# Patient Record
Sex: FEMALE | Race: WHITE | NOT HISPANIC OR LATINO | Employment: OTHER | URBAN - METROPOLITAN AREA
[De-identification: names, ages, dates, MRNs, and addresses within clinical notes are randomized per-mention and may not be internally consistent; named-entity substitution may affect disease eponyms.]

---

## 2022-09-14 PROBLEM — E03.9 HYPOTHYROIDISM: Status: ACTIVE | Noted: 2017-12-26

## 2022-09-14 PROBLEM — E78.5 HYPERLIPIDEMIA: Status: ACTIVE | Noted: 2021-10-24

## 2022-09-14 PROBLEM — I10 HYPERTENSIVE DISORDER: Status: ACTIVE | Noted: 2017-12-26

## 2022-09-14 PROBLEM — J30.9 ALLERGIC RHINITIS: Status: ACTIVE | Noted: 2018-03-06

## 2022-09-14 PROBLEM — I48.20 CHRONIC ATRIAL FIBRILLATION: Status: ACTIVE | Noted: 2021-07-31

## 2022-09-21 ENCOUNTER — LAB (OUTPATIENT)
Dept: LAB | Facility: HOSPITAL | Age: 77
End: 2022-09-21

## 2022-09-21 ENCOUNTER — OFFICE VISIT (OUTPATIENT)
Dept: FAMILY MEDICINE CLINIC | Facility: CLINIC | Age: 77
End: 2022-09-21

## 2022-09-21 VITALS
HEIGHT: 65 IN | BODY MASS INDEX: 36.52 KG/M2 | WEIGHT: 219.2 LBS | DIASTOLIC BLOOD PRESSURE: 80 MMHG | OXYGEN SATURATION: 98 % | SYSTOLIC BLOOD PRESSURE: 122 MMHG | HEART RATE: 104 BPM

## 2022-09-21 DIAGNOSIS — E78.2 MIXED HYPERLIPIDEMIA: ICD-10-CM

## 2022-09-21 DIAGNOSIS — I48.20 CHRONIC ATRIAL FIBRILLATION: ICD-10-CM

## 2022-09-21 DIAGNOSIS — E03.9 HYPOTHYROIDISM, UNSPECIFIED TYPE: Primary | ICD-10-CM

## 2022-09-21 DIAGNOSIS — I10 PRIMARY HYPERTENSION: ICD-10-CM

## 2022-09-21 DIAGNOSIS — E03.9 HYPOTHYROIDISM, UNSPECIFIED TYPE: ICD-10-CM

## 2022-09-21 DIAGNOSIS — M1A.9XX0 CHRONIC GOUT INVOLVING TOE OF LEFT FOOT WITHOUT TOPHUS, UNSPECIFIED CAUSE: ICD-10-CM

## 2022-09-21 DIAGNOSIS — J30.9 ALLERGIC RHINITIS, UNSPECIFIED SEASONALITY, UNSPECIFIED TRIGGER: ICD-10-CM

## 2022-09-21 PROCEDURE — 99204 OFFICE O/P NEW MOD 45 MIN: CPT | Performed by: STUDENT IN AN ORGANIZED HEALTH CARE EDUCATION/TRAINING PROGRAM

## 2022-09-21 PROCEDURE — 84443 ASSAY THYROID STIM HORMONE: CPT

## 2022-09-21 NOTE — PROGRESS NOTES
New Patient Office Visit      Patient Name: Margoth Perez  : 1945   MRN: [id number]   Care Team: Patient Care Team:  Liliya Yoder DO as PCP - General (Internal Medicine)    Chief Complaint:    Chief Complaint   Patient presents with   • new patient preventative medicine service     Est care    • Dizziness     Started last month   • night time voiding    • Edema   • Fatigue   • varicosities   • Gout       History of Present Illness: Margoth Perez is a 77 y.o. female with HTN, hypothyroidism, afib on eliquis, HLD, and allergic rhinitis who is here today to establish care. She moved from [place] to [place] last year. She lives alone, she has three adult children.  She is planning to move within the next year. Previously following with [facility] - Destiny Mejia PA-C. Follows with Dr. Steel and Geraldo for cardiology needs. Follows with Dr. Kilgore for Ophthalmology needs.     Hypothyroidism - taking Synthroid 88mcg daily, has been on this dose for several years    HTN - taking Lisinopril 5mg daily, very well controlled with this    AF on Eliquis and metoprolol. Follows with [facility] Lino Steel and Geraldo.    She complains of bilateral hip pain, has been diagnosed with IT band syndrome, chronic low back pain.    Gout - takes prednisone prn for flares. She has had 3 flares in her left foot around the big toe in the past year. She does not want to take medications every day to lower her uric acid level such as allopurinol.    Obesity - has gained about 20lb in the past 2 years, lives sedentary lifestyle.     Former smoker, quit 22 years ago.     Subjective      Review of Systems:   Review of Systems - See HPI    Past Medical History:   Past Medical History:   Diagnosis Date   • A-fib (HCC)    • Allergic    • Arthritis    • Coronary artery disease     Afib   • Gout    • Headache    • Hypertension    • Hypothyroidism    • Low back pain    • Seasonal  allergies    • Visual impairment        Past Surgical History:   Past Surgical History:   Procedure Laterality Date   • ADENOIDECTOMY     • CARDIAC CATHETERIZATION     • FOOT SURGERY Bilateral    • TONSILLECTOMY         Family History: History reviewed. No pertinent family history.    Social History:   Social History     Socioeconomic History   • Marital status:    Tobacco Use   • Smoking status: Former Smoker     Packs/day: 0.50     Years: 25.00     Pack years: 12.50     Types: Cigarettes     Quit date: 2000     Years since quittin.7   • Smokeless tobacco: Never Used   • Tobacco comment: Quit    Vaping Use   • Vaping Use: Never used   Substance and Sexual Activity   • Alcohol use: Yes     Comment: Rare consumption   • Drug use: Never   • Sexual activity: Not Currently     Partners: Male       Tobacco History:   Social History     Tobacco Use   Smoking Status Former Smoker   • Packs/day: 0.50   • Years: 25.00   • Pack years: 12.50   • Types: Cigarettes   • Quit date: 2000   • Years since quittin.7   Smokeless Tobacco Never Used   Tobacco Comment    Quit        Medications:     Current Outpatient Medications:   •  apixaban (ELIQUIS) 5 MG tablet tablet, Take 5 mg by mouth 2 (Two) Times a Day., Disp: , Rfl:   •  levothyroxine (SYNTHROID, LEVOTHROID) 88 MCG tablet, levothyroxine 88 mcg tablet, Disp: , Rfl:   •  lisinopril (PRINIVIL,ZESTRIL) 5 MG tablet, lisinopril 5 mg tablet, Disp: , Rfl:   •  loratadine (CLARITIN) 10 MG tablet, Claritin 10 mg tablet  Take 1 tablet every day by oral route., Disp: , Rfl:   •  metoprolol tartrate (LOPRESSOR) 50 MG tablet, Take 75 mg by mouth 2 (Two) Times a Day., Disp: , Rfl:     Allergies:   Allergies   Allergen Reactions   • Progesterone Shortness Of Breath   • Latex Hives   • Wound Dressing Adhesive Hives   • Adhesive Tape Rash       Objective     Physical Exam:  Vital Signs:   Vitals:    22 1312   BP: 122/80   Pulse: 104   SpO2: 98%  "  Weight: 99.4 kg (219 lb 3.2 oz)   Height: 165.1 cm (65\")     Body mass index is 36.48 kg/m².     Physical Exam  Vitals reviewed.   Constitutional:       Appearance: Normal appearance.   Pulmonary:      Effort: Pulmonary effort is normal. No respiratory distress.   Skin:     General: Skin is warm and dry.   Neurological:      Mental Status: She is alert.   Psychiatric:         Mood and Affect: Mood normal.         Behavior: Behavior normal.         Judgment: Judgment normal.         Assessment / Plan      Assessment/Plan:   Problems Addressed This Visit  Diagnoses and all orders for this visit:    1. Hypothyroidism, unspecified type (Primary)  -     TSH Rfx On Abnormal To Free T4; Future  Due for labs today, will refill synthroid and adjust dose if necessary.    2. Allergic rhinitis, unspecified seasonality, unspecified trigger  Continue claritin daily    3. Chronic atrial fibrillation (HCC)  Continue Eliquis and metoprolol 75mg BID. Her rate is a little high today, may benefit from increasing metoprolol to 100mg BID in the future, did not have time to address this in detail today. Follows with [facility] Lino Steel and Geraldo.    4. Mixed hyperlipidemia  Due for lipid panel but she is not fasting today. Will plan for this at next visit     5. Primary hypertension  Well controlled, at goal <130/80. Continue lisinopril 5mg daily    6. Chronic gout   Discussed urate lowering options such as allopurinol to prevent flares but she prefers to not take medication daily, prefers to monitor and treat flares as needed. We discussed dietary modifications. She does not drink significant amount of alcohol. She does not have current flare but requested prescription for prednisone as needed. I explained to her that I do not typically write steroids to be available on an as needed basis due to safety/appropriation of use but also reassured her that if she were to have a flare, she is now established and may " call//message to seek treatment when needed.     Plan of care reviewed with patient at the conclusion of today's visit. Education was provided regarding diagnosis and management.  Patient verbalizes understanding of and agreement with management plan.      Follow Up:   Return if symptoms worsen or fail to improve.          DO LUISA Vasquez RD  [facility]  [address]  [facility] [address]  Fax [phone]  Phone [phone]

## 2022-09-22 ENCOUNTER — TELEPHONE (OUTPATIENT)
Dept: FAMILY MEDICINE CLINIC | Facility: CLINIC | Age: 77
End: 2022-09-22

## 2022-09-22 DIAGNOSIS — E03.9 HYPOTHYROIDISM, UNSPECIFIED TYPE: Primary | ICD-10-CM

## 2022-09-22 LAB — TSH SERPL DL<=0.05 MIU/L-ACNC: 1 UIU/ML (ref 0.27–4.2)

## 2022-09-22 RX ORDER — LEVOTHYROXINE SODIUM 88 UG/1
88 TABLET ORAL DAILY
Qty: 90 TABLET | Refills: 3 | Status: SHIPPED | OUTPATIENT
Start: 2022-09-22

## 2022-09-22 NOTE — TELEPHONE ENCOUNTER
Attempted to contact patient no answer left vm       Message from Liliya Yoder, DO sent at 9/22/2022    Please call patient with results. Thyroid function is normal, I have sent refills for her synthroid      HUB MAY RELAY RESULTS, AND DOCUMENT

## 2022-09-27 ENCOUNTER — TELEPHONE (OUTPATIENT)
Dept: FAMILY MEDICINE CLINIC | Facility: CLINIC | Age: 77
End: 2022-09-27

## 2022-09-27 DIAGNOSIS — I10 PRIMARY HYPERTENSION: ICD-10-CM

## 2022-09-27 DIAGNOSIS — I48.20 CHRONIC ATRIAL FIBRILLATION: Primary | ICD-10-CM

## 2022-09-27 DIAGNOSIS — E78.2 MIXED HYPERLIPIDEMIA: ICD-10-CM

## 2022-09-27 NOTE — TELEPHONE ENCOUNTER
Pt called the office back stating she is very upset that labs weren't ordered at her visit on 9/21/2022. PCP's message from below was relayed. Pt states she was told by PCP that labs would be ordered at that time. I informed her that I'm not sure what happened but she has placed orders today if she would like to have labs done. Pt states this is not the medical facility that she would like to be part of if she can't have labs ordered when she is told and hung up the phone.     Liliya Yoder,      9/27/22 8:20 AM  Note    She is due for cholesterol labs and also annual screening of blood counts, kidney function, liver function. I have put in orders for all. Please fast for these.

## 2022-09-27 NOTE — TELEPHONE ENCOUNTER
She is due for cholesterol labs and also annual screening of blood counts, kidney function, liver function. I have put in orders for all. Please fast for these.

## 2022-09-27 NOTE — TELEPHONE ENCOUNTER
Caller: Margoth Perez    Relationship to patient: Self    Best call back number:     Patient is needing: CALLING LILIAM BACK IN THE OFFICE

## 2022-09-27 NOTE — TELEPHONE ENCOUNTER
Attempted to contact pt, no answer. Left detailed voicemail informing pt that labs have been ordered, lab hours given, and if she had additional questions to give the office a call.

## 2022-09-27 NOTE — TELEPHONE ENCOUNTER
Caller: Margoth Perez    Relationship: Self    Best call back number: [phone]    What orders are you requesting (i.e. lab or imaging): CHOLESTEROL LABS    In what timeframe would the patient need to come in: ASAP     Where will you receive your lab/imaging services: IN OFFICE

## 2023-04-17 ENCOUNTER — HOSPITAL ENCOUNTER (EMERGENCY)
Facility: HOSPITAL | Age: 78
Discharge: HOME | End: 2023-04-18
Attending: EMERGENCY MEDICINE | Admitting: EMERGENCY MEDICINE
Payer: COMMERCIAL

## 2023-04-17 ENCOUNTER — APPOINTMENT (OUTPATIENT)
Dept: GENERAL RADIOLOGY | Facility: HOSPITAL | Age: 78
End: 2023-04-17
Payer: COMMERCIAL

## 2023-04-17 DIAGNOSIS — E66.09 CLASS 2 OBESITY DUE TO EXCESS CALORIES WITH BODY MASS INDEX (BMI) OF 37.0 TO 37.9 IN ADULT, UNSPECIFIED WHETHER SERIOUS COMORBIDITY PRESENT: ICD-10-CM

## 2023-04-17 DIAGNOSIS — I48.0 PAROXYSMAL ATRIAL FIBRILLATION WITH RAPID VENTRICULAR RESPONSE: Primary | ICD-10-CM

## 2023-04-17 DIAGNOSIS — E78.5 HYPERLIPIDEMIA, UNSPECIFIED HYPERLIPIDEMIA TYPE: ICD-10-CM

## 2023-04-17 DIAGNOSIS — I10 ELEVATED BLOOD PRESSURE READING WITH DIAGNOSIS OF HYPERTENSION: ICD-10-CM

## 2023-04-17 LAB
ALBUMIN SERPL-MCNC: 4.1 G/DL (ref 3.5–5.2)
ALBUMIN/GLOB SERPL: 1.4 G/DL
ALP SERPL-CCNC: 99 U/L (ref 39–117)
ALT SERPL W P-5'-P-CCNC: 17 U/L (ref 1–33)
ANION GAP SERPL CALCULATED.3IONS-SCNC: 10 MMOL/L (ref 5–15)
AST SERPL-CCNC: 18 U/L (ref 1–32)
BASOPHILS # BLD AUTO: 0.03 10*3/MM3 (ref 0–0.2)
BASOPHILS NFR BLD AUTO: 0.2 % (ref 0–1.5)
BILIRUB SERPL-MCNC: 0.4 MG/DL (ref 0–1.2)
BUN SERPL-MCNC: 17 MG/DL (ref 8–23)
BUN/CREAT SERPL: 19.3 (ref 7–25)
CALCIUM SPEC-SCNC: 9.2 MG/DL (ref 8.6–10.5)
CHLORIDE SERPL-SCNC: 100 MMOL/L (ref 98–107)
CO2 SERPL-SCNC: 30 MMOL/L (ref 22–29)
CREAT SERPL-MCNC: 0.88 MG/DL (ref 0.57–1)
DEPRECATED RDW RBC AUTO: 44.3 FL (ref 37–54)
EGFRCR SERPLBLD CKD-EPI 2021: 67.8 ML/MIN/1.73
EOSINOPHIL # BLD AUTO: 0.07 10*3/MM3 (ref 0–0.4)
EOSINOPHIL NFR BLD AUTO: 0.5 % (ref 0.3–6.2)
ERYTHROCYTE [DISTWIDTH] IN BLOOD BY AUTOMATED COUNT: 12.9 % (ref 12.3–15.4)
GLOBULIN UR ELPH-MCNC: 3 GM/DL
GLUCOSE SERPL-MCNC: 107 MG/DL (ref 65–99)
HCT VFR BLD AUTO: 44.4 % (ref 34–46.6)
HGB BLD-MCNC: 14.5 G/DL (ref 12–15.9)
HOLD SPECIMEN: NORMAL
HOLD SPECIMEN: NORMAL
IMM GRANULOCYTES # BLD AUTO: 0.18 10*3/MM3 (ref 0–0.05)
IMM GRANULOCYTES NFR BLD AUTO: 1.3 % (ref 0–0.5)
LIPASE SERPL-CCNC: 30 U/L (ref 13–60)
LYMPHOCYTES # BLD AUTO: 2.65 10*3/MM3 (ref 0.7–3.1)
LYMPHOCYTES NFR BLD AUTO: 19.1 % (ref 19.6–45.3)
MCH RBC QN AUTO: 30.5 PG (ref 26.6–33)
MCHC RBC AUTO-ENTMCNC: 32.7 G/DL (ref 31.5–35.7)
MCV RBC AUTO: 93.5 FL (ref 79–97)
MONOCYTES # BLD AUTO: 1.38 10*3/MM3 (ref 0.1–0.9)
MONOCYTES NFR BLD AUTO: 9.9 % (ref 5–12)
NEUTROPHILS NFR BLD AUTO: 69 % (ref 42.7–76)
NEUTROPHILS NFR BLD AUTO: 9.57 10*3/MM3 (ref 1.7–7)
NRBC BLD AUTO-RTO: 0 /100 WBC (ref 0–0.2)
NT-PROBNP SERPL-MCNC: 3162 PG/ML (ref 0–1800)
PLATELET # BLD AUTO: 390 10*3/MM3 (ref 140–450)
PMV BLD AUTO: 8.9 FL (ref 6–12)
POTASSIUM SERPL-SCNC: 3.7 MMOL/L (ref 3.5–5.2)
PROT SERPL-MCNC: 7.1 G/DL (ref 6–8.5)
QT INTERVAL: 348 MS
QTC INTERVAL: 459 MS
RBC # BLD AUTO: 4.75 10*6/MM3 (ref 3.77–5.28)
SODIUM SERPL-SCNC: 140 MMOL/L (ref 136–145)
TROPONIN T SERPL HS-MCNC: 8 NG/L
WBC NRBC COR # BLD: 13.88 10*3/MM3 (ref 3.4–10.8)
WHOLE BLOOD HOLD COAG: NORMAL
WHOLE BLOOD HOLD SPECIMEN: NORMAL

## 2023-04-17 PROCEDURE — 93005 ELECTROCARDIOGRAM TRACING: CPT

## 2023-04-17 PROCEDURE — 80053 COMPREHEN METABOLIC PANEL: CPT

## 2023-04-17 PROCEDURE — 84484 ASSAY OF TROPONIN QUANT: CPT

## 2023-04-17 PROCEDURE — 99283 EMERGENCY DEPT VISIT LOW MDM: CPT

## 2023-04-17 PROCEDURE — 96374 THER/PROPH/DIAG INJ IV PUSH: CPT

## 2023-04-17 PROCEDURE — 36415 COLL VENOUS BLD VENIPUNCTURE: CPT

## 2023-04-17 PROCEDURE — 85025 COMPLETE CBC W/AUTO DIFF WBC: CPT

## 2023-04-17 PROCEDURE — 96375 TX/PRO/DX INJ NEW DRUG ADDON: CPT

## 2023-04-17 PROCEDURE — 99152 MOD SED SAME PHYS/QHP 5/>YRS: CPT

## 2023-04-17 PROCEDURE — 92960 CARDIOVERSION ELECTRIC EXT: CPT

## 2023-04-17 PROCEDURE — 83690 ASSAY OF LIPASE: CPT

## 2023-04-17 PROCEDURE — 83880 ASSAY OF NATRIURETIC PEPTIDE: CPT

## 2023-04-17 PROCEDURE — 71045 X-RAY EXAM CHEST 1 VIEW: CPT

## 2023-04-17 RX ORDER — SODIUM CHLORIDE 0.9 % (FLUSH) 0.9 %
10 SYRINGE (ML) INJECTION AS NEEDED
Status: DISCONTINUED | OUTPATIENT
Start: 2023-04-17 | End: 2023-04-18 | Stop reason: HOSPADM

## 2023-04-17 NOTE — ED PROVIDER NOTES
"Subjective   History of Present Illness  Patient is a 77-year-old female presenting to the emergency department with generalized chest heaviness starting around 330 today.  Patient does have a prior history of atrial fibrillation and states she is unsure when she is in it though she does take Eliquis and is compliant with medications.  Patient reports she went to the walk-in clinic and they sent her here for further evaluation.  Patient reports she was recently started on treatment for gout approximately 4 days ago.  Patient denies any palpitations.  No other acute symptoms or complaints reported.    History provided by:  Patient and medical records      Review of Systems    Past Medical History:   Diagnosis Date   • A-fib    • Allergic    • Arthritis    • Coronary artery disease     Afib   • Gout    • Headache    • Hypertension    • Hypothyroidism    • Low back pain    • Seasonal allergies    • Visual impairment        Allergies   Allergen Reactions   • Medroxyprogesterone Unknown - Low Severity   • Progesterone Shortness Of Breath     Other reaction(s): other/intolerance   • Latex Hives   • Wound Dressing Adhesive Hives   • Sumatriptan Unknown - Low Severity     Panic attack   • Adhesive Tape Rash   • Fexofenadine-Pseudoephed Er Unknown - High Severity     \"allergic to some sort of antihistamine\"       Past Surgical History:   Procedure Laterality Date   • ADENOIDECTOMY     • CARDIAC CATHETERIZATION     • FOOT SURGERY Bilateral    • TONSILLECTOMY         History reviewed. No pertinent family history.    Social History     Socioeconomic History   • Marital status:    Tobacco Use   • Smoking status: Former     Packs/day: 0.50     Years: 25.00     Pack years: 12.50     Types: Cigarettes     Quit date: 2000     Years since quittin.3   • Smokeless tobacco: Never   • Tobacco comments:     Quit    Vaping Use   • Vaping Use: Never used   Substance and Sexual Activity "   • Alcohol use: Yes     Comment: Rare consumption   • Drug use: Never   • Sexual activity: Not Currently     Partners: Male           Objective   Physical Exam  Vitals and nursing note reviewed.   Constitutional:       General: She is not in acute distress.     Appearance: She is well-developed. She is obese. She is not toxic-appearing.   Cardiovascular:      Rate and Rhythm: Normal rate. Rhythm irregularly irregular.   Pulmonary:      Effort: Pulmonary effort is normal. No respiratory distress.      Breath sounds: Normal breath sounds.   Musculoskeletal:         General: Normal range of motion.   Skin:     General: Skin is warm and dry.   Neurological:      Mental Status: She is alert and oriented to person, place, and time.   Psychiatric:         Mood and Affect: Mood normal.         Behavior: Behavior normal.         Procedural Sedation    Date/Time: 4/18/2023 2:09 AM  Performed by: Ephraim Sunshine MD  Authorized by: Ephraim Sunshine MD     Consent:     Consent obtained:  Written    Consent given by:  Patient    Risks discussed:  Allergic reaction, prolonged hypoxia resulting in organ damage, respiratory compromise necessitating ventilatory assistance and intubation, vomiting, nausea, inadequate sedation and dysrhythmia  Universal protocol:     Procedure explained and questions answered to patient or proxy's satisfaction: yes      Immediately prior to procedure, a time out was called: yes      Patient identity confirmed:  Verbally with patient and provided demographic data  Indications:     Procedure performed:  Cardioversion    Procedure necessitating sedation performed by:  Physician performing sedation    Intended level of sedation:  Deep  Pre-sedation assessment:     Time since last food or drink:  12+ hrs    ASA classification: class 2 - patient with mild systemic disease      Mallampati score:  II - soft palate, uvula, fauces visible    Neck mobility: normal      Pre-sedation assessments  completed and reviewed: airway patency, anesthesia/sedation history, cardiovascular function, hydration status, mental status, nausea/vomiting, pain level, respiratory function and temperature      History of difficult intubation: no    Immediate pre-procedure details:     Reassessment: Patient reassessed immediately prior to procedure      Reviewed: vital signs, relevant labs/tests and NPO status      Verified: bag valve mask available, emergency equipment available, IV patency confirmed, oxygen available and suction available    Procedure details (see MAR for exact dosages):     Sedation start time:  4/18/2023 1:36 AM    Preoxygenation:  Nonrebreather mask    Sedation:  Methohexital    Intra-procedure monitoring:  Blood pressure monitoring, continuous capnometry, frequent LOC assessments, frequent vital sign checks, continuous pulse oximetry and cardiac monitor    Intra-procedure events: none      Sedation end time:  4/18/2023 1:46 AM    Total sedation time (minutes):  10  Post-procedure details:     Attendance: Constant attendance by certified staff until patient recovered      Recovery: Patient returned to pre-procedure baseline      Procedure completion:  Tolerated well, no immediate complications  Electrical Cardioversion    Date/Time: 4/18/2023 2:10 AM  Performed by: Ephraim Sunshine MD  Authorized by: Ephraim Sunshine MD     Consent:     Consent obtained:  Written    Consent given by:  Patient  Universal protocol:     Procedure explained and questions answered to patient or proxy's satisfaction: yes      Immediately prior to procedure, a time out was called: yes      Patient identity confirmed:  Verbally with patient and provided demographic data  Pre-procedure details:     Cardioversion basis:  Emergent    Rhythm:  Atrial fibrillation    Electrode placement:  Anterior-posterior  Attempt one:     Cardioversion mode:  Synchronous    Waveform:  Biphasic    Shock (Joules):  150    Shock outcome:   Conversion to normal sinus rhythm  Post-procedure details:     Patient status:  Awake    Procedure completion:  Tolerated well, no immediate complications               ED Course  ED Course as of 04/18/23 0211   Mon Apr 17, 2023   1803 I reviewed the report from a cardiac catheterization in February 2021 demonstrating negative coronary artery disease. [RS]   1939 BP(!): 174/161  Noted on arrival in triage that the patient's documented blood pressure is not compatible with human physiology. [RS]   2357 XR Chest 1 View  Personally reviewed the single view the chest demonstrating no focal lobar infiltrate.  See report for radiology for details. [RS]   Tue Apr 18, 2023   0110 Patient is resting comfortably.  I talked with the patient about consideration for sedation and electrical cardioversion.  I discussed the risks and benefits.  Patient reports she has had it done in the past and would like to have that done once again.  She is agreeable to proceed. [RS]   0147 Successful sedation and electrical cardioversion to a normal sinus rhythm.  Patient tolerated well no observed complications.  We will plan to discharge the patient to follow-up with the A-fib clinic for further evaluation and management. I had a discussion with the patient/family regarding diagnosis, diagnostic results, treatment plan, and medications.  The patient/family indicated understanding of these instructions.  I spent adequate time at the bedside prior to discharge necessary to discuss the aftercare instructions, giving patient education, providing explanations of the results of our evaluations/findings, and my decision making to assure that the patient/family understand the plan of care.  Time was allotted to answer questions at that time and throughout the ED course.  Emphasis was placed on timely follow-up after discharge.  I also discussed the potential for the development of an acute emergent condition requiring further evaluation, return to  the ER, admission, or even surgical intervention. I discussed that we found nothing during the visit today indicating the need for further ER workup at this time, admission to the hospital, or the presence of an acute unstable medical condition.  I encouraged the patient to return to the emergency department immediately for ANY concerns, worsening, new complaints, or if symptoms persist and unable to seek follow-up in a timely fashion.  The patient/family expressed understanding and agreement with this plan.  [RS]      ED Course User Index  [RS] Ephraim Sunshine MD                                           Medical Decision Making  Class 2 obesity due to excess calories with body mass index (BMI) of 37.0 to 37.9 in adult, unspecified whether serious comorbidity present: chronic illness or injury  Elevated blood pressure reading with diagnosis of hypertension: chronic illness or injury  Hyperlipidemia, unspecified hyperlipidemia type: chronic illness or injury  Paroxysmal atrial fibrillation with rapid ventricular response: acute illness or injury  Amount and/or Complexity of Data Reviewed  Labs: ordered.  Radiology:  Decision-making details documented in ED Course.  ECG/medicine tests: ordered and independent interpretation performed.      Risk  Prescription drug management.          Final diagnoses:   Paroxysmal atrial fibrillation with rapid ventricular response   Elevated blood pressure reading with diagnosis of hypertension   Class 2 obesity due to excess calories with body mass index (BMI) of 37.0 to 37.9 in adult, unspecified whether serious comorbidity present   Hyperlipidemia, unspecified hyperlipidemia type       ED Disposition  ED Disposition     ED Disposition   Discharge    Condition   Stable    Comment   --             [facility]  [address]  [address]  [facility] [id number] [phone]    As soon as possible.    [facility]  [facility]  [address] [facility] [address] [phone]    As needed, If symptoms worsen or ANY concerns.    Liliya Yoder, DO  [address] [address]  [phone]               Medication List      No changes were made to your prescriptions during this visit.          Ephraim Sunshine MD  04/18/23 0212

## 2023-04-18 VITALS
HEIGHT: 65 IN | BODY MASS INDEX: 37.32 KG/M2 | DIASTOLIC BLOOD PRESSURE: 81 MMHG | TEMPERATURE: 98.4 F | HEART RATE: 80 BPM | RESPIRATION RATE: 18 BRPM | SYSTOLIC BLOOD PRESSURE: 157 MMHG | WEIGHT: 224 LBS | OXYGEN SATURATION: 100 %

## 2023-04-18 LAB
GEN 5 2HR TROPONIN T REFLEX: 9 NG/L
HOLD SPECIMEN: NORMAL
QT INTERVAL: 368 MS
QTC INTERVAL: 450 MS
TROPONIN T DELTA: 1 NG/L

## 2023-04-18 PROCEDURE — 84484 ASSAY OF TROPONIN QUANT: CPT

## 2023-04-18 RX ORDER — METOPROLOL TARTRATE 5 MG/5ML
5 INJECTION INTRAVENOUS ONCE
Status: COMPLETED | OUTPATIENT
Start: 2023-04-18 | End: 2023-04-18

## 2023-04-18 RX ADMIN — METHOHEXITAL SODIUM 60 MG: 500 INJECTION, POWDER, LYOPHILIZED, FOR SOLUTION INTRAMUSCULAR; INTRAVENOUS; RECTAL at 01:37

## 2023-04-18 RX ADMIN — METHOHEXITAL SODIUM 20 MG: 500 INJECTION, POWDER, LYOPHILIZED, FOR SOLUTION INTRAMUSCULAR; INTRAVENOUS; RECTAL at 01:38

## 2023-04-18 RX ADMIN — METHOHEXITAL SODIUM 20 MG: 500 INJECTION, POWDER, LYOPHILIZED, FOR SOLUTION INTRAMUSCULAR; INTRAVENOUS; RECTAL at 01:39

## 2023-04-18 RX ADMIN — METOPROLOL TARTRATE 5 MG: 5 INJECTION INTRAVENOUS at 02:07

## 2023-04-18 RX ADMIN — METOPROLOL TARTRATE 5 MG: 5 INJECTION INTRAVENOUS at 01:45

## 2023-04-18 RX ADMIN — METHOHEXITAL SODIUM 20 MG: 500 INJECTION, POWDER, LYOPHILIZED, FOR SOLUTION INTRAMUSCULAR; INTRAVENOUS; RECTAL at 01:37

## 2023-04-27 ENCOUNTER — OFFICE VISIT (OUTPATIENT)
Dept: CARDIOLOGY | Facility: HOSPITAL | Age: 78
End: 2023-04-27
Payer: COMMERCIAL

## 2023-04-27 VITALS
DIASTOLIC BLOOD PRESSURE: 71 MMHG | RESPIRATION RATE: 20 BRPM | BODY MASS INDEX: 37.18 KG/M2 | HEIGHT: 65 IN | SYSTOLIC BLOOD PRESSURE: 129 MMHG | HEART RATE: 92 BPM | WEIGHT: 223.13 LBS | TEMPERATURE: 98.3 F | OXYGEN SATURATION: 97 %

## 2023-04-27 DIAGNOSIS — I10 PRIMARY HYPERTENSION: ICD-10-CM

## 2023-04-27 DIAGNOSIS — I48.0 PAF (PAROXYSMAL ATRIAL FIBRILLATION): Primary | ICD-10-CM

## 2023-04-27 NOTE — PROGRESS NOTES
"[facility], [facility] Heart and Vascular    Chief Complaint  Atrial Fibrillation and Establish Care    Subjective    History of Present Illness {CC  Problem List  Visit  Diagnosis   Encounters  Notes  Medications  Labs  Result Review Imaging  Media :23}     Margoth Perez presents to [facility] CARDIOLOGY for   History of Present Illness     77-year-old female with history of hypothyroidism, paroxysmal atrial fibrillation, hypertension, gout.      Presented to [facility] ED on 4/17/2023 with complaints of chest heaviness and discomfort.    Patient's blood pressure was found to be elevated.  Found to be in atrial fibrillation.  Cardioversion was completed in the ER and successful with restoration of sinus rhythm.    Patient continued on Eliquis for stroke prevention and beta-blocker.    Pt followed by cardiology [facility].      Pt typically is not aware of afib but on 2 occassions she had chest pain and was told she was in Afib and received ECV.  1st in 2021 with dx and then 04/17/23.  Otherwise no palpitations, worsening dyspnea (baseline dyspnea mild-moderate with exertion), dizziness, near syncope, syncope, edema.    Compliant with meds.      Objective     Vital Signs:   Vitals:    04/27/23 1442 04/27/23 1444 04/27/23 1447   BP: 128/85 122/70 129/71   BP Location: Right arm Left arm Left arm   Patient Position: Sitting Standing Sitting   Cuff Size: Adult Adult Adult   Pulse: 86 86 92   Resp:   20   Temp:   98.3 °F (36.8 °C)   TempSrc:   Temporal   SpO2: 98% 97% 97%   Weight:   101 kg (223 lb 2 oz)   Height:   165.1 cm (65\")     Body mass index is 37.13 kg/m².  Physical Exam  Vitals reviewed.   Constitutional:       General: She is not in acute distress.     Appearance: Normal appearance.   Cardiovascular:      Rate and Rhythm: Normal rate and regular rhythm.      Heart sounds: Normal heart sounds.   Pulmonary:      Effort: Pulmonary effort is normal.      " Breath sounds: Normal breath sounds.   Musculoskeletal:      Right lower leg: No edema.      Left lower leg: No edema.   Skin:     General: Skin is warm and dry.   Neurological:      Mental Status: She is alert.   Psychiatric:         Mood and Affect: Mood normal.         Behavior: Behavior is cooperative.              Result Review  Data Reviewed:{ Labs  Result Review  Imaging  Med Tab  Media :23}   Left heart catheterization 2/25/2021: No significant coronary artery disease.    Echocardiogram 5/18/2021: EF 56 to 60% (improved from previous EF 2021 of 45 to 50%),    Admission on 04/17/2023, Discharged on 04/18/2023   Component Date Value Ref Range Status   • QT Interval 04/17/2023 348  ms Final   • QTC Interval 04/17/2023 459  ms Final   • QT Interval 04/17/2023 368  ms Final   • QTC Interval 04/17/2023 450  ms Final   • HS Troponin T 04/17/2023 8  <10 ng/L Final   • Glucose 04/17/2023 107 (H)  65 - 99 mg/dL Final   • BUN 04/17/2023 17  8 - 23 mg/dL Final   • Creatinine 04/17/2023 0.88  0.57 - 1.00 mg/dL Final   • Sodium 04/17/2023 140  136 - 145 mmol/L Final   • Potassium 04/17/2023 3.7  3.5 - 5.2 mmol/L Final    Slight hemolysis detected by analyzer. Results may be affected.   • Chloride 04/17/2023 100  98 - 107 mmol/L Final   • CO2 04/17/2023 30.0 (H)  22.0 - 29.0 mmol/L Final   • Calcium 04/17/2023 9.2  8.6 - 10.5 mg/dL Final   • Total Protein 04/17/2023 7.1  6.0 - 8.5 g/dL Final   • Albumin 04/17/2023 4.1  3.5 - 5.2 g/dL Final   • ALT (SGPT) 04/17/2023 17  1 - 33 U/L Final   • AST (SGOT) 04/17/2023 18  1 - 32 U/L Final   • Alkaline Phosphatase 04/17/2023 99  39 - 117 U/L Final   • Total Bilirubin 04/17/2023 0.4  0.0 - 1.2 mg/dL Final   • Globulin 04/17/2023 3.0  gm/dL Final    Calculated Result   • A/G Ratio 04/17/2023 1.4  g/dL Final   • BUN/Creatinine Ratio 04/17/2023 19.3  7.0 - 25.0 Final   • Anion Gap 04/17/2023 10.0  5.0 - 15.0 mmol/L Final   • eGFR 04/17/2023 67.8  >60.0 mL/min/1.73 Final   • Lipase  04/17/2023 30  13 - 60 U/L Final   • proBNP 04/17/2023 3,162.0 (H)  0.0 - 1,800.0 pg/mL Final   • Extra Tube 04/17/2023 Hold for add-ons.   Final    Auto resulted.   • Extra Tube 04/17/2023 hold for add-on   Final    Auto resulted   • Extra Tube 04/17/2023 Hold for add-ons.   Final    Auto resulted.   • Extra Tube 04/17/2023 Hold for add-ons.   Final    Auto resulted.   • Extra Tube 04/17/2023 Hold for add-ons.   Final    Auto resulted   • WBC 04/17/2023 13.88 (H)  3.40 - 10.80 10*3/mm3 Final   • RBC 04/17/2023 4.75  3.77 - 5.28 10*6/mm3 Final   • Hemoglobin 04/17/2023 14.5  12.0 - 15.9 g/dL Final   • Hematocrit 04/17/2023 44.4  34.0 - 46.6 % Final   • MCV 04/17/2023 93.5  79.0 - 97.0 fL Final   • MCH 04/17/2023 30.5  26.6 - 33.0 pg Final   • MCHC 04/17/2023 32.7  31.5 - 35.7 g/dL Final   • RDW 04/17/2023 12.9  12.3 - 15.4 % Final   • RDW-SD 04/17/2023 44.3  37.0 - 54.0 fl Final   • MPV 04/17/2023 8.9  6.0 - 12.0 fL Final   • Platelets 04/17/2023 390  140 - 450 10*3/mm3 Final   • Neutrophil % 04/17/2023 69.0  42.7 - 76.0 % Final   • Lymphocyte % 04/17/2023 19.1 (L)  19.6 - 45.3 % Final   • Monocyte % 04/17/2023 9.9  5.0 - 12.0 % Final   • Eosinophil % 04/17/2023 0.5  0.3 - 6.2 % Final   • Basophil % 04/17/2023 0.2  0.0 - 1.5 % Final   • Immature Grans % 04/17/2023 1.3 (H)  0.0 - 0.5 % Final   • Neutrophils, Absolute 04/17/2023 9.57 (H)  1.70 - 7.00 10*3/mm3 Final   • Lymphocytes, Absolute 04/17/2023 2.65  0.70 - 3.10 10*3/mm3 Final   • Monocytes, Absolute 04/17/2023 1.38 (H)  0.10 - 0.90 10*3/mm3 Final   • Eosinophils, Absolute 04/17/2023 0.07  0.00 - 0.40 10*3/mm3 Final   • Basophils, Absolute 04/17/2023 0.03  0.00 - 0.20 10*3/mm3 Final   • Immature Grans, Absolute 04/17/2023 0.18 (H)  0.00 - 0.05 10*3/mm3 Final   • nRBC 04/17/2023 0.0  0.0 - 0.2 /100 WBC Final   • HS Troponin T 04/18/2023 9  <10 ng/L Final   • Troponin T Delta 04/18/2023 1  >=-4 - <+4 ng/L Final                   Assessment and Plan {CC Problem  List  Visit Diagnosis  ROS  Review (Popup)  Health Maintenance  Quality  BestPractice  Medications  SmartSets  SnapShot Encounters  Media :23}   1. PAF (paroxysmal atrial fibrillation)  S/p ECV  Last occurrence 2021    Currently on beta-blocker, Eliquis for stroke prevention.    Discussed options of increasing beta-blocker, patient would like to monitor at this time    Education discussed about antiarrhythmics, pulmonary vein ablation if indicated.  (Education packet given)    Patient will continue to follow with primary cardiologist.    2. Primary hypertension  Blood pressure better controlled today on beta-blocker and lisinopril.          I spent 30 minutes caring for Margoth on this date of service. This time includes time spent by me in the following activities:preparing for the visit, reviewing tests, obtaining and/or reviewing a separately obtained history, performing a medically appropriate examination and/or evaluation , counseling and educating the patient/family/caregiver and documenting information in the medical record    Follow Up {Instructions Charge Capture  Follow-up Communications :23}   Return if symptoms worsen or fail to improve.    Patient was given instructions and counseling regarding her condition or for health maintenance advice. Please see specific information pulled into the AVS if appropriate.  Patient was instructed to call the [facility] with any questions, concerns, or worsening symptoms.

## 2023-05-02 ENCOUNTER — TELEPHONE (OUTPATIENT)
Dept: CARDIOLOGY | Facility: HOSPITAL | Age: 78
End: 2023-05-02
Payer: COMMERCIAL

## 2023-05-02 DIAGNOSIS — I10 PRIMARY HYPERTENSION: Primary | ICD-10-CM

## 2023-05-02 DIAGNOSIS — I48.0 PAF (PAROXYSMAL ATRIAL FIBRILLATION): ICD-10-CM

## 2023-05-02 NOTE — TELEPHONE ENCOUNTER
Your EKG results were stable in the ED.  No concerning findings.    I will send a referral for the primary care provider.  Be scheduling will contact her to discuss options and scheduling.

## 2023-05-02 NOTE — TELEPHONE ENCOUNTER
----- Message from Michelle Colbert MA sent at 5/2/2023  8:50 AM EDT -----  [employer] Telephone Note for:    Margoth Perez, 1945  Home Phone      [phone]  Mobile          [phone]      Reason for Call:  Recommendation for a new PCP and results of CXR    Symptoms:      Onset::      Anything Tried?:      Other Pertinent Information (Weight, Vitals, etc.):  Pt inquiry about her CXR from the ED. She stated no on went over it with her and she would like a recommendation for a new PCP within [facility].

## 2023-05-05 NOTE — TELEPHONE ENCOUNTER
Spoke to patient and notified her of test results. Patient states that she has been contacted by primary care and has set up an appt.

## 2023-05-19 ENCOUNTER — OFFICE VISIT (OUTPATIENT)
Dept: INTERNAL MEDICINE | Facility: CLINIC | Age: 78
End: 2023-05-19
Payer: COMMERCIAL

## 2023-05-19 VITALS
SYSTOLIC BLOOD PRESSURE: 124 MMHG | HEIGHT: 65 IN | RESPIRATION RATE: 20 BRPM | OXYGEN SATURATION: 99 % | BODY MASS INDEX: 37.49 KG/M2 | HEART RATE: 87 BPM | DIASTOLIC BLOOD PRESSURE: 80 MMHG | TEMPERATURE: 97.9 F | WEIGHT: 225 LBS

## 2023-05-19 DIAGNOSIS — E66.01 CLASS 2 SEVERE OBESITY WITH SERIOUS COMORBIDITY AND BODY MASS INDEX (BMI) OF 37.0 TO 37.9 IN ADULT, UNSPECIFIED OBESITY TYPE: ICD-10-CM

## 2023-05-19 DIAGNOSIS — I48.20 CHRONIC ATRIAL FIBRILLATION: ICD-10-CM

## 2023-05-19 DIAGNOSIS — J30.9 ALLERGIC RHINITIS, UNSPECIFIED SEASONALITY, UNSPECIFIED TRIGGER: ICD-10-CM

## 2023-05-19 DIAGNOSIS — M1A.9XX0 CHRONIC GOUT INVOLVING TOE OF LEFT FOOT WITHOUT TOPHUS, UNSPECIFIED CAUSE: ICD-10-CM

## 2023-05-19 DIAGNOSIS — E03.9 HYPOTHYROIDISM, UNSPECIFIED TYPE: ICD-10-CM

## 2023-05-19 DIAGNOSIS — I10 PRIMARY HYPERTENSION: Primary | ICD-10-CM

## 2023-05-19 NOTE — PROGRESS NOTES
"    Office Note     Name: Margoth Perez    : 1945     MRN: [id number]     Chief Complaint  Hypertension (Continue care), Gout (Continue care), Establish Care, and Weight Gain (Would like to discuss medication )    Subjective     History of Present Illness:  Margoth Perez is a 77 y.o. female who presents today for establishing care.  She states that she has chronic medical conditions including chronic atrial fibrillation and sees a cardiologist as well as gout and allergies and hypothyroidism and hypertension and uses medicine for these and they are stable and she is frustrated with the weight gain she has had over the last few years and has not been able to lose it and would like to discuss weight loss options.  She is not a smoker.  Past medical history: As above  Social history: Negative tobacco she is a former smoker and quit 30 years ago, occasional and rare EtOH use, she lives alone and has 3 adult children and she is retired from being an  and from other jobs.    Review of Systems   Respiratory: Negative for cough, shortness of breath and wheezing.    Cardiovascular: Negative for chest pain and palpitations.   Gastrointestinal: Negative for blood in stool, diarrhea and vomiting.   Genitourinary: Negative for dysuria, flank pain and genital sores.       Objective     Vital Signs  /80 (Cuff Size: Large Adult)   Pulse 87   Temp 97.9 °F (36.6 °C) (Infrared)   Resp 20   Ht 165.1 cm (65\")   Wt 102 kg (225 lb)   SpO2 99%   BMI 37.44 kg/m²   Estimated body mass index is 37.44 kg/m² as calculated from the following:    Height as of this encounter: 165.1 cm (65\").    Weight as of this encounter: 102 kg (225 lb).          Physical Exam  Vitals and nursing note reviewed.   HENT:      Head: Normocephalic and atraumatic.   Neck:      Vascular: No carotid bruit.   Cardiovascular:      Rate and Rhythm: Normal rate and regular rhythm.      Heart sounds: Normal heart sounds. No " murmur heard.    No gallop.   Pulmonary:      Effort: Pulmonary effort is normal. No respiratory distress.      Breath sounds: No stridor. No wheezing, rhonchi or rales.   Musculoskeletal:      Cervical back: Normal range of motion and neck supple.      Right lower leg: No edema.      Left lower leg: No edema.   Lymphadenopathy:      Cervical: No cervical adenopathy.   Neurological:      Mental Status: She is alert.                 Assessment and Plan     Diagnoses and all orders for this visit:    1. Primary hypertension (Primary)--stable with current prescription medication and she will continue    2. Hypothyroidism, unspecified type stable with current prescription medication and she will continue    3. Chronic atrial fibrillation stable with current prescription medication and she will continue    4. 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 Allergic rhinitis, unspecified seasonality, unspecified trigger stable    6. Class 2 severe obesity with serious comorbidity and body mass index (BMI) of 37.0 to 37.9 in adult, unspecified obesity type start Wegovy and return in 1 month and discussed side effects and risks  -     Semaglutide-Weight Management 0.25 MG/0.5ML solution auto-injector; Inject 0.5 mL under the skin into the appropriate area as directed 1 (One) Time Per Week.  Dispense: 3 mL; Refill: 1    7.  Chronic anticoagulation-stable        Follow Up  Return in about 4 weeks (around 6/16/2023) for Recheck.    Melody Franco DO

## 2023-05-22 ENCOUNTER — PRIOR AUTHORIZATION (OUTPATIENT)
Dept: INTERNAL MEDICINE | Facility: CLINIC | Age: 78
End: 2023-05-22
Payer: COMMERCIAL

## 2023-06-14 ENCOUNTER — TELEPHONE (OUTPATIENT)
Dept: INTERNAL MEDICINE | Facility: CLINIC | Age: 78
End: 2023-06-14
Payer: COMMERCIAL

## 2023-06-14 NOTE — TELEPHONE ENCOUNTER
"Advised pt that she would need an appt to be prescribed anything for her dizziness. She was reluctant to either make an appt or do a telehealth visit. Advised that it would be considered a liability that we prescribe medication without being evaluated and treat properly and would be \"bad medicine\". Offered a visit either in office or by video. She felt this was not a reasonable request. At that point had pt on hold for our practice manager to speak with her.   "

## 2023-06-14 NOTE — TELEPHONE ENCOUNTER
Caller: Margoth Perez    Relationship: Self    Best call back number: [phone]       What was the call regarding: PATIENT IS REQUESTING MECLIZINE 25 I TABLET POD. STATES SHE HAS HAD THIS BEFORE AND DESPERATELY NEEDS IT NOW. PATIENT WAS DISORIENTED ALL NIGHT AND ALMOST FELL. PATIENT WAS LAST SEEN 5/19    Is it okay if the provider responds through MyChart: CALL        [id number] - [place], [place] - [address] AT [facility] [address] - [phone] [facility] [phone]  [phone]

## 2023-07-17 ENCOUNTER — OFFICE VISIT (OUTPATIENT)
Dept: INTERNAL MEDICINE | Facility: CLINIC | Age: 78
End: 2023-07-17
Payer: COMMERCIAL

## 2023-07-17 VITALS
OXYGEN SATURATION: 95 % | HEART RATE: 100 BPM | TEMPERATURE: 96.9 F | DIASTOLIC BLOOD PRESSURE: 86 MMHG | SYSTOLIC BLOOD PRESSURE: 140 MMHG | WEIGHT: 228.4 LBS | HEIGHT: 65 IN | BODY MASS INDEX: 38.05 KG/M2

## 2023-07-17 DIAGNOSIS — E66.01 MORBID (SEVERE) OBESITY DUE TO EXCESS CALORIES: ICD-10-CM

## 2023-07-17 DIAGNOSIS — R06.2 WHEEZING: Primary | ICD-10-CM

## 2023-07-17 DIAGNOSIS — T78.40XS ALLERGY, SEQUELA: ICD-10-CM

## 2023-07-17 PROCEDURE — 99214 OFFICE O/P EST MOD 30 MIN: CPT | Performed by: STUDENT IN AN ORGANIZED HEALTH CARE EDUCATION/TRAINING PROGRAM

## 2023-07-17 RX ORDER — IPRATROPIUM BROMIDE 21 UG/1
2 SPRAY, METERED NASAL EVERY 12 HOURS PRN
Qty: 30 ML | Refills: 11 | Status: SHIPPED | OUTPATIENT
Start: 2023-07-17

## 2023-07-17 RX ORDER — ALBUTEROL SULFATE 90 UG/1
2 AEROSOL, METERED RESPIRATORY (INHALATION) EVERY 6 HOURS PRN
Qty: 18 G | Refills: 11 | Status: SHIPPED | OUTPATIENT
Start: 2023-07-17

## 2023-07-17 RX ORDER — LANOLIN ALCOHOL/MO/W.PET/CERES
1000 CREAM (GRAM) TOPICAL DAILY
COMMUNITY

## 2023-07-17 NOTE — PROGRESS NOTES
Office Note     Name: Margoth Perez    : 1945     MRN: [id number]     Chief Complaint  Wheezing (/), Shortness of Breath (Increasing /), Atrial Fibrillation, and Fatigue    Subjective     History of Present Illness:  Margoth Perez is a 78 y.o. female who presents today for     Patient is looking to move to [place].  Complaining of Shortness of breath increasing and wheezing during the allergy season.   She has environmental allergies claritin PRN.   No dyspnea on exertion, no fever.      Social history: Negative tobacco she is a former smoker and quit 30 years ago, occasional and rare EtOH use, she lives alone and has 3 adult children and she is retired from being an  and from other jobs.       Review of Systems:   Review of Systems   All other systems reviewed and are negative.    Past Medical History:   Past Medical History:   Diagnosis Date    A-fib     Allergic     Arthritis     Coronary artery disease     Afib    Gout     Headache     History of medical problems     Hypertension     Hypothyroidism     Low back pain     Obesity     Seasonal allergies     Visual impairment        Past Surgical History:   Past Surgical History:   Procedure Laterality Date    ADENOIDECTOMY      CARDIAC CATHETERIZATION      FOOT SURGERY Bilateral     TONSILLECTOMY         Family History:   Family History   Problem Relation Age of Onset    Arthritis Mother     Vision loss Mother     COPD Father     Hearing loss Father     Asthma Sister     Depression Son        Social History:   Social History     Socioeconomic History    Marital status:    Tobacco Use    Smoking status: Former     Packs/day: 0.50     Years: 25.00     Pack years: 12.50     Types: Cigarettes     Quit date: 2000     Years since quittin.5    Smokeless tobacco: Never    Tobacco comments:     Quit    Vaping Use    Vaping Use: Never used   Substance and Sexual Activity     Alcohol use: Yes     Comment: Rare consumption    Drug use: Never    Sexual activity: Defer       Immunizations:   Immunization History   Administered Date(s) Administered    COVID-19 (PFIZER) BIVALENT 12+YRS 09/22/2022    COVID-19 (PFIZER) Purple Cap Monovalent 04/01/2021, 04/22/2021, 11/17/2021, 12/02/2021    Fluzone High-Dose 65+yrs 11/04/2021, 11/10/2022        Medications:     Current Outpatient Medications:     apixaban (ELIQUIS) 5 MG tablet tablet, Take 1 tablet by mouth 2 (Two) Times a Day., Disp: , Rfl:     Cholecalciferol 50 MCG (2000 UT) tablet, Take 1 tablet by mouth Daily., Disp: , Rfl:     colchicine 0.6 MG tablet, Take 1 tablet by mouth Take As Directed. For gout flare, Disp: , Rfl:     levothyroxine (SYNTHROID, LEVOTHROID) 88 MCG tablet, Take 1 tablet by mouth Daily., Disp: 90 tablet, Rfl: 3    lisinopril (PRINIVIL,ZESTRIL) 5 MG tablet, Take 1 tablet by mouth Daily., Disp: , Rfl:     loratadine (CLARITIN) 10 MG tablet, Take 1 tablet by mouth Daily As Needed for Allergies., Disp: , Rfl:     metoprolol tartrate (LOPRESSOR) 50 MG tablet, Take 1.5 tablets by mouth 2 (Two) Times a Day., Disp: , Rfl:     vitamin B-12 (CYANOCOBALAMIN) 1000 MCG tablet, Take 1 tablet by mouth Daily., Disp: , Rfl:     albuterol sulfate  (90 Base) MCG/ACT inhaler, Inhale 2 puffs Every 6 (Six) Hours As Needed for Wheezing., Disp: 18 g, Rfl: 11    ipratropium (ATROVENT) 0.03 % nasal spray, 2 sprays into the nostril(s) as directed by provider Every 12 (Twelve) Hours As Needed for Rhinitis., Disp: 30 mL, Rfl: 11    meclizine (Antivert) 25 MG chewable tablet chewable tablet, Chew 1 tablet 3 (Three) Times a Day As Needed (dizziness). (Patient not taking: Reported on 7/17/2023), Disp: 30 tablet, Rfl: 2    predniSONE (DELTASONE) 10 MG tablet, Daily taper - 6/5/4/3/2/1 (Patient not taking: Reported on 7/17/2023), Disp: 21 tablet, Rfl: 0    Semaglutide-Weight Management 0.25 MG/0.5ML solution auto-injector, Inject 0.5 mL under the  "skin into the appropriate area as directed 1 (One) Time Per Week. (Patient not taking: Reported on 7/17/2023), Disp: 3 mL, Rfl: 1    Allergies:   Allergies   Allergen Reactions    Medroxyprogesterone Unknown - Low Severity    Progesterone Shortness Of Breath     \"panic attack\"    Latex Itching    Wound Dressing Adhesive Hives and Itching    Sumatriptan Unknown - Low Severity     Panic attack    Adhesive Tape Rash    Fexofenadine-Pseudoephed Er Unknown - High Severity     \"allergic to some sort of antihistamine\"       Objective     Vital Signs  /86   Pulse 100   Temp 96.9 °F (36.1 °C) (Temporal)   Ht 165.1 cm (65\")   Wt 104 kg (228 lb 6.4 oz)   SpO2 95%   BMI 38.01 kg/m²   Estimated body mass index is 38.01 kg/m² as calculated from the following:    Height as of this encounter: 165.1 cm (65\").    Weight as of this encounter: 104 kg (228 lb 6.4 oz).    Class 2 Severe Obesity (BMI >=35 and <=39.9). Obesity-related health conditions include the following: none. Obesity is unchanged. BMI is is above average; BMI management plan is completed. We discussed low calorie, low carb based diet program, portion control, and increasing exercise.      Physical Exam  Constitutional:       Appearance: Normal appearance. She is obese.   Cardiovascular:      Rate and Rhythm: Normal rate and regular rhythm.      Pulses: Normal pulses.      Heart sounds: Normal heart sounds.   Pulmonary:      Effort: Pulmonary effort is normal.      Breath sounds: Normal breath sounds.   Neurological:      Mental Status: She is alert.        Result Review :                  Assessment and Plan     1. Wheezing  Appears to be related to enviormental allergies  Recommend OTC claritin or zrtec as needed.  - albuterol sulfate  (90 Base) MCG/ACT inhaler; Inhale 2 puffs Every 6 (Six) Hours As Needed for Wheezing.  Dispense: 18 g; Refill: 11    2. Allergy, sequela    - ipratropium (ATROVENT) 0.03 % nasal spray; 2 sprays into the nostril(s) as " directed by provider Every 12 (Twelve) Hours As Needed for Rhinitis.  Dispense: 30 mL; Refill: 11    3. Morbid (severe) obesity due to excess calories  Discussed risk associated with obesity. she was given instructions on calorie counting as well as on decreasing carbohydrate intake. she was also encouraged to start exercise regimen.  Instructed patient to download fitness cata on her smart phone to aid in calorie counting and exercise tracking.         Follow Up  No follow-ups on file.    MD EPIFANIO [facility]imentel[facility] [address]  [facility]  [address]  [address] [address] [phone]

## 2023-08-04 ENCOUNTER — TELEPHONE (OUTPATIENT)
Dept: INTERNAL MEDICINE | Facility: CLINIC | Age: 78
End: 2023-08-04
Payer: COMMERCIAL

## 2023-08-04 NOTE — TELEPHONE ENCOUNTER
Caller: Margoth Perez    Relationship: Self    Best call back number: [phone]    What medications are you currently taking:   Current Outpatient Medications on File Prior to Visit   Medication Sig Dispense Refill    albuterol sulfate  (90 Base) MCG/ACT inhaler Inhale 2 puffs Every 6 (Six) Hours As Needed for Wheezing. 18 g 11    apixaban (ELIQUIS) 5 MG tablet tablet Take 1 tablet by mouth 2 (Two) Times a Day.      Cholecalciferol 50 MCG (2000 UT) tablet Take 1 tablet by mouth Daily.      colchicine 0.6 MG tablet Take 1 tablet by mouth Take As Directed. For gout flare      ipratropium (ATROVENT) 0.03 % nasal spray 2 sprays into the nostril(s) as directed by provider Every 12 (Twelve) Hours As Needed for Rhinitis. 30 mL 11    levothyroxine (SYNTHROID, LEVOTHROID) 88 MCG tablet Take 1 tablet by mouth Daily. 90 tablet 3    lisinopril (PRINIVIL,ZESTRIL) 5 MG tablet Take 1 tablet by mouth Daily.      loratadine (CLARITIN) 10 MG tablet Take 1 tablet by mouth Daily As Needed for Allergies.      meclizine (Antivert) 25 MG chewable tablet chewable tablet Chew 1 tablet 3 (Three) Times a Day As Needed (dizziness). (Patient not taking: Reported on 7/17/2023) 30 tablet 2    metoprolol tartrate (LOPRESSOR) 50 MG tablet Take 1.5 tablets by mouth 2 (Two) Times a Day.      predniSONE (DELTASONE) 10 MG tablet Daily taper - 6/5/4/3/2/1 (Patient not taking: Reported on 7/17/2023) 21 tablet 0    Semaglutide-Weight Management 0.25 MG/0.5ML solution auto-injector Inject 0.5 mL under the skin into the appropriate area as directed 1 (One) Time Per Week. (Patient not taking: Reported on 7/17/2023) 3 mL 1    vitamin B-12 (CYANOCOBALAMIN) 1000 MCG tablet Take 1 tablet by mouth Daily.       No current facility-administered medications on file prior to visit.        Which medication are you concerned about: ipratropium (ATROVENT) 0.03 % nasal spray  AND loratadine (CLARITIN) 10 MG tablet     Who prescribed you this medication:  [facility]    What are your concerns: PATIENT WANTS TO VERIFY THE PHARMACY SAID THEY CAN TAKE THESE TWO LISTED MEDS TOGETHER    How long have you had these concerns: SINCE BEING GIVEN THE NASAL SPRAY

## 2023-08-07 NOTE — TELEPHONE ENCOUNTER
Called and spoke with patient, informed her that it is ok to take both prescriptions together. She verbalized understanding and had no further questions.

## 2023-08-09 ENCOUNTER — OFFICE VISIT (OUTPATIENT)
Dept: INTERNAL MEDICINE | Facility: CLINIC | Age: 78
End: 2023-08-09
Payer: COMMERCIAL

## 2023-08-09 ENCOUNTER — TELEPHONE (OUTPATIENT)
Dept: INTERNAL MEDICINE | Facility: CLINIC | Age: 78
End: 2023-08-09

## 2023-08-09 ENCOUNTER — LAB (OUTPATIENT)
Dept: INTERNAL MEDICINE | Facility: CLINIC | Age: 78
End: 2023-08-09
Payer: COMMERCIAL

## 2023-08-09 VITALS
TEMPERATURE: 97.5 F | WEIGHT: 230.2 LBS | DIASTOLIC BLOOD PRESSURE: 90 MMHG | BODY MASS INDEX: 38.35 KG/M2 | HEIGHT: 65 IN | SYSTOLIC BLOOD PRESSURE: 158 MMHG | HEART RATE: 65 BPM

## 2023-08-09 DIAGNOSIS — R06.2 WHEEZING: ICD-10-CM

## 2023-08-09 DIAGNOSIS — E03.9 HYPOTHYROIDISM, UNSPECIFIED TYPE: ICD-10-CM

## 2023-08-09 DIAGNOSIS — E66.01 MORBID (SEVERE) OBESITY DUE TO EXCESS CALORIES: Primary | ICD-10-CM

## 2023-08-09 DIAGNOSIS — I10 HYPERTENSION, UNSPECIFIED TYPE: ICD-10-CM

## 2023-08-09 LAB
CHOLEST SERPL-MCNC: 164 MG/DL (ref 0–200)
HDLC SERPL-MCNC: 51 MG/DL (ref 40–60)
LDLC SERPL CALC-MCNC: 96 MG/DL (ref 0–100)
LDLC/HDLC SERPL: 1.85 {RATIO}
PTH-INTACT SERPL-MCNC: 65.6 PG/ML (ref 15–65)
TRIGL SERPL-MCNC: 92 MG/DL (ref 0–150)
TSH SERPL DL<=0.05 MIU/L-ACNC: 2.21 UIU/ML (ref 0.27–4.2)
VLDLC SERPL-MCNC: 17 MG/DL (ref 5–40)

## 2023-08-09 PROCEDURE — 82533 TOTAL CORTISOL: CPT | Performed by: STUDENT IN AN ORGANIZED HEALTH CARE EDUCATION/TRAINING PROGRAM

## 2023-08-09 PROCEDURE — 84443 ASSAY THYROID STIM HORMONE: CPT | Performed by: STUDENT IN AN ORGANIZED HEALTH CARE EDUCATION/TRAINING PROGRAM

## 2023-08-09 PROCEDURE — 80061 LIPID PANEL: CPT | Performed by: STUDENT IN AN ORGANIZED HEALTH CARE EDUCATION/TRAINING PROGRAM

## 2023-08-09 PROCEDURE — 82306 VITAMIN D 25 HYDROXY: CPT | Performed by: STUDENT IN AN ORGANIZED HEALTH CARE EDUCATION/TRAINING PROGRAM

## 2023-08-09 PROCEDURE — 82607 VITAMIN B-12: CPT | Performed by: STUDENT IN AN ORGANIZED HEALTH CARE EDUCATION/TRAINING PROGRAM

## 2023-08-09 PROCEDURE — 83970 ASSAY OF PARATHORMONE: CPT | Performed by: STUDENT IN AN ORGANIZED HEALTH CARE EDUCATION/TRAINING PROGRAM

## 2023-08-09 NOTE — TELEPHONE ENCOUNTER
PATIENT WOULD LIKE A COPY OF HER LABS TO BE SENT TO HER HOUSE ONCE RESULTED.  SHE WOULD LIKE TO TAKE THEM WITH HER WHEN SHE MOVES TO [place].  THANK YOU

## 2023-08-09 NOTE — PROGRESS NOTES
Office Note     Name: Margoth Perez    : 1945     MRN: [id number]     Chief Complaint  Hypothyroidism (Stated has weight gain since last visit )    Subjective     History of Present Illness:  Margoth Perez is a 78 y.o. female who presents today for     Here for discussion about weight management. She has tried multiple diets and feel like she is unable to lose any weight. She started wegovy but due to med shortages at the pharmacy, it has been difficult to her medications.   She wants to check her thyroid levels, and make sure her dosage of synthroid is correct      Review of Systems:   Review of Systems   All other systems reviewed and are negative.    Past Medical History:   Past Medical History:   Diagnosis Date    A-fib     Allergic     Arthritis     Coronary artery disease     Afib    Gout     Headache     History of medical problems     Hypertension     Hypothyroidism     Low back pain     Obesity     Seasonal allergies     Visual impairment 1963       Past Surgical History:   Past Surgical History:   Procedure Laterality Date    ADENOIDECTOMY      CARDIAC CATHETERIZATION      FOOT SURGERY Bilateral     TONSILLECTOMY         Family History:   Family History   Problem Relation Age of Onset    Arthritis Mother     Vision loss Mother     COPD Father     Hearing loss Father     Asthma Sister     Depression Son        Social History:   Social History     Socioeconomic History    Marital status:    Tobacco Use    Smoking status: Former     Packs/day: 0.50     Years: 25.00     Pack years: 12.50     Types: Cigarettes     Quit date: 2000     Years since quittin.6    Smokeless tobacco: Never    Tobacco comments:     Quit    Vaping Use    Vaping Use: Never used   Substance and Sexual Activity    Alcohol use: Yes     Comment: Rare consumption    Drug use: Never    Sexual activity: Defer       Immunizations:   Immunization History   Administered Date(s)  "Administered    COVID-19 (PFIZER) BIVALENT 12+YRS 09/22/2022    COVID-19 (PFIZER) Purple Cap Monovalent 04/01/2021, 04/22/2021, 11/17/2021, 12/02/2021    Fluzone High-Dose 65+yrs 11/04/2021, 11/10/2022        Medications:     Current Outpatient Medications:     albuterol sulfate  (90 Base) MCG/ACT inhaler, Inhale 2 puffs Every 6 (Six) Hours As Needed for Wheezing., Disp: 18 g, Rfl: 11    apixaban (ELIQUIS) 5 MG tablet tablet, Take 1 tablet by mouth 2 (Two) Times a Day., Disp: , Rfl:     Cholecalciferol 50 MCG (2000 UT) tablet, Take 1 tablet by mouth Daily., Disp: , Rfl:     colchicine 0.6 MG tablet, Take 1 tablet by mouth Take As Directed. For gout flare, Disp: , Rfl:     ipratropium (ATROVENT) 0.03 % nasal spray, 2 sprays into the nostril(s) as directed by provider Every 12 (Twelve) Hours As Needed for Rhinitis., Disp: 30 mL, Rfl: 11    levothyroxine (SYNTHROID, LEVOTHROID) 88 MCG tablet, Take 1 tablet by mouth Daily., Disp: 90 tablet, Rfl: 3    lisinopril (PRINIVIL,ZESTRIL) 5 MG tablet, Take 1 tablet by mouth Daily., Disp: , Rfl:     loratadine (CLARITIN) 10 MG tablet, Take 1 tablet by mouth Daily As Needed for Allergies., Disp: , Rfl:     meclizine (Antivert) 25 MG chewable tablet chewable tablet, Chew 1 tablet 3 (Three) Times a Day As Needed (dizziness)., Disp: 30 tablet, Rfl: 2    metoprolol tartrate (LOPRESSOR) 50 MG tablet, Take 1.5 tablets by mouth 2 (Two) Times a Day., Disp: , Rfl:     predniSONE (DELTASONE) 10 MG tablet, Daily taper - 6/5/4/3/2/1, Disp: 21 tablet, Rfl: 0    Semaglutide-Weight Management 0.25 MG/0.5ML solution auto-injector, Inject 0.5 mL under the skin into the appropriate area as directed 1 (One) Time Per Week., Disp: 3 mL, Rfl: 1    vitamin B-12 (CYANOCOBALAMIN) 1000 MCG tablet, Take 1 tablet by mouth Daily., Disp: , Rfl:     Allergies:   Allergies   Allergen Reactions    Medroxyprogesterone Unknown - Low Severity    Progesterone Shortness Of Breath     \"panic attack\"    Latex " "Itching    Wound Dressing Adhesive Hives and Itching    Sumatriptan Unknown - Low Severity     Panic attack    Adhesive Tape Rash    Fexofenadine-Pseudoephed Er Unknown - High Severity     \"allergic to some sort of antihistamine\"       Objective     Vital Signs  /90   Pulse 65   Temp 97.5 øF (36.4 øC) (Temporal)   Ht 165.1 cm (65\")   Wt 104 kg (230 lb 3.2 oz)   PF 94 L/min   BMI 38.31 kg/mý   Estimated body mass index is 38.31 kg/mý as calculated from the following:    Height as of this encounter: 165.1 cm (65\").    Weight as of this encounter: 104 kg (230 lb 3.2 oz).            Physical Exam  Constitutional:       Appearance: Normal appearance. She is obese.   Cardiovascular:      Rate and Rhythm: Normal rate and regular rhythm.      Pulses: Normal pulses.      Heart sounds: Normal heart sounds.   Pulmonary:      Effort: Pulmonary effort is normal.      Breath sounds: Normal breath sounds.   Neurological:      Mental Status: She is alert.        Result Review :                  Assessment and Plan   1. Morbid (severe) obesity due to excess calories  Recommended to continue with wegovy for weight loss, will adjust dosage once able to obtain from pharmacy.  Discussed risk associated with obesity. she was given instructions on calorie counting as well as on decreasing carbohydrate intake. she was also encouraged to start exercise regimen.  Instructed patient to download fitness cata on her smart phone to aid in calorie counting and exercise tracking.    - TSH Rfx On Abnormal To Free T4; Future  - PTH, Intact; Future  - Lipid Panel; Future    2. Wheezing  Continue with albuterol PRN    3. Hypothyroidism, unspecified type  Currently on Synthroid 88 MCG  Check TSH, t4  - TSH Rfx On Abnormal To Free T4; Future  - PTH, Intact; Future  - Vitamin D,25-Hydroxy; Future  - Vitamin B12; Future    4. Hypertension, unspecified type  Continue with Lisinopril 5mg prn, lopressor 50mg             I spent 40 minutes caring " osmel Justin on this date of service. This time includes time spent by me in the following activities:preparing for the visit, reviewing tests, obtaining and/or reviewing a separately obtained history, performing a medically appropriate examination and/or evaluation , counseling and educating the patient/family/caregiver, ordering medications, tests, or procedures, referring and communicating with other health care professionals , and documenting information in the medical record      Follow Up  No follow-ups on file.    MD EPIFANIO PimentelE [facility] [address]  [facility]  [address]  [address] [address]  [phone]

## 2023-08-10 LAB
25(OH)D3 SERPL-MCNC: 35.2 NG/ML (ref 30–100)
CORTIS SERPL-MCNC: 6.66 MCG/DL
VIT B12 BLD-MCNC: 557 PG/ML (ref 211–946)

## 2023-08-11 NOTE — TELEPHONE ENCOUNTER
Pt wants lab results results     PATIENT WOULD LIKE A COPY OF HER LABS TO BE SENT TO HER HOUSE ONCE RESULTED.  SHE WOULD LIKE TO TAKE THEM WITH HER WHEN SHE MOVES TO [place].  THANK YOU

## 2023-08-14 ENCOUNTER — TELEPHONE (OUTPATIENT)
Dept: INTERNAL MEDICINE | Facility: CLINIC | Age: 78
End: 2023-08-14
Payer: COMMERCIAL

## 2023-08-14 NOTE — TELEPHONE ENCOUNTER
Pn, pt expressed understanding.  Lab results were sent by mail .     ----- Message from Ricardo Ricketts MD sent at 8/11/2023  4:56 PM EDT -----  Her  parathyroid hormones, her cholesterol leels, her thyroid hormones are stable she needs to continue to take her synthroid 88mcg, her vitamin b12 was normal, her vitamin d was normal.     We will send a copy of her labs to her address.

## 2023-10-18 ENCOUNTER — OFFICE VISIT (OUTPATIENT)
Dept: INTERNAL MEDICINE | Facility: CLINIC | Age: 78
End: 2023-10-18
Payer: COMMERCIAL

## 2023-10-18 VITALS
DIASTOLIC BLOOD PRESSURE: 70 MMHG | OXYGEN SATURATION: 96 % | SYSTOLIC BLOOD PRESSURE: 122 MMHG | HEART RATE: 109 BPM | TEMPERATURE: 96.8 F

## 2023-10-18 DIAGNOSIS — E03.9 HYPOTHYROIDISM, UNSPECIFIED TYPE: ICD-10-CM

## 2023-10-18 DIAGNOSIS — E66.01 MORBID (SEVERE) OBESITY DUE TO EXCESS CALORIES: Primary | ICD-10-CM

## 2023-10-18 DIAGNOSIS — J30.81 CAT ALLERGIES: ICD-10-CM

## 2023-10-18 RX ORDER — LEVOTHYROXINE SODIUM 88 UG/1
88 TABLET ORAL DAILY
Qty: 90 TABLET | Refills: 3 | Status: SHIPPED | OUTPATIENT
Start: 2023-10-18

## 2023-10-18 RX ORDER — SEMAGLUTIDE 0.25 MG/.5ML
INJECTION, SOLUTION SUBCUTANEOUS
Qty: 6 ML | Refills: 0 | Status: SHIPPED | OUTPATIENT
Start: 2023-10-18 | End: 2024-01-10

## 2023-10-18 NOTE — TELEPHONE ENCOUNTER
Rx Refill Note  Requested Prescriptions     Pending Prescriptions Disp Refills    levothyroxine (SYNTHROID, LEVOTHROID) 88 MCG tablet [Pharmacy Med Name: LEVOTHYROXINE 0.088MG (88MCG) TAB] 90 tablet 3     Sig: TAKE 1 TABLET BY MOUTH DAILY      Last office visit with prescribing clinician: 9/21/2022     Next office visit with prescribing clinician: no follow up on file    Ivon Mendoza MA  10/18/23, 08:44 EDT

## 2023-10-18 NOTE — PROGRESS NOTES
Office Note     Name: Margoth Perez    : 1945     MRN: [id number]     Chief Complaint  Weight Gain    Subjective     History of Present Illness:  Margoth Perez is a 78 y.o. female who presents today for       First month slept on futon at her sons house.   Last 2 weeks slept on recliner  Starting having right upper leg pain.   Trying to move,     Started about one month ago.  Coughing up phlegm, was staying at her son/daughter-in law house who had 2 cats, was coughing for the past couple months.      She has history of HRT doing in the 90s  She thinks this is th reason why she had 100s lbs extra.     She visited her son daughter-law is a phd in humanities at [employer], [place].     Review of Systems:   Review of Systems   All other systems reviewed and are negative.      Past Medical History:   Past Medical History:   Diagnosis Date    A-fib     Allergic     Arthritis     Coronary artery disease     Afib    Gout     Headache     History of medical problems     Hypertension     Hypothyroidism     Low back pain     Obesity     Seasonal allergies     Visual impairment        Past Surgical History:   Past Surgical History:   Procedure Laterality Date    ADENOIDECTOMY      CARDIAC CATHETERIZATION      FOOT SURGERY Bilateral     TONSILLECTOMY         Family History:   Family History   Problem Relation Age of Onset    Arthritis Mother     Vision loss Mother     COPD Father     Hearing loss Father     Asthma Sister     Depression Son        Social History:   Social History     Socioeconomic History    Marital status:    Tobacco Use    Smoking status: Former     Packs/day: 0.50     Years: 25.00     Additional pack years: 0.00     Total pack years: 12.50     Types: Cigarettes     Quit date: 2000     Years since quittin.8    Smokeless tobacco: Never    Tobacco comments:     Quit    Vaping Use    Vaping Use: Never used   Substance and Sexual Activity     Alcohol use: Yes     Comment: Rare consumption    Drug use: Never    Sexual activity: Defer       Immunizations:   Immunization History   Administered Date(s) Administered    COVID-19 (PFIZER) BIVALENT 12+YRS 09/22/2022    COVID-19 (PFIZER) Purple Cap Monovalent 04/01/2021, 04/22/2021, 11/17/2021, 12/02/2021    Fluzone High-Dose 65+yrs 11/04/2021, 11/10/2022        Medications:     Current Outpatient Medications:     albuterol sulfate  (90 Base) MCG/ACT inhaler, Inhale 2 puffs Every 6 (Six) Hours As Needed for Wheezing., Disp: 18 g, Rfl: 11    apixaban (ELIQUIS) 5 MG tablet tablet, Take 1 tablet by mouth 2 (Two) Times a Day., Disp: , Rfl:     Cholecalciferol 50 MCG (2000 UT) tablet, Take 1 tablet by mouth Daily., Disp: , Rfl:     colchicine 0.6 MG tablet, Take 1 tablet by mouth Take As Directed. For gout flare, Disp: , Rfl:     ipratropium (ATROVENT) 0.03 % nasal spray, 2 sprays into the nostril(s) as directed by provider Every 12 (Twelve) Hours As Needed for Rhinitis., Disp: 30 mL, Rfl: 11    levothyroxine (SYNTHROID, LEVOTHROID) 88 MCG tablet, TAKE 1 TABLET BY MOUTH DAILY, Disp: 90 tablet, Rfl: 3    lisinopril (PRINIVIL,ZESTRIL) 5 MG tablet, Take 1 tablet by mouth Daily., Disp: , Rfl:     loratadine (CLARITIN) 10 MG tablet, Take 1 tablet by mouth Daily As Needed for Allergies., Disp: , Rfl:     meclizine (Antivert) 25 MG chewable tablet chewable tablet, Chew 1 tablet 3 (Three) Times a Day As Needed (dizziness)., Disp: 30 tablet, Rfl: 2    metoprolol tartrate (LOPRESSOR) 50 MG tablet, Take 1.5 tablets by mouth 2 (Two) Times a Day., Disp: , Rfl:     predniSONE (DELTASONE) 10 MG tablet, Daily taper - 6/5/4/3/2/1, Disp: 21 tablet, Rfl: 0    vitamin B-12 (CYANOCOBALAMIN) 1000 MCG tablet, Take 1 tablet by mouth Daily., Disp: , Rfl:     Semaglutide-Weight Management (Wegovy) 0.25 MG/0.5ML solution auto-injector, Inject 0.25 mg under the skin into the appropriate area as directed Every 7 (Seven) Days for 28 days,  "THEN 0.5 mg Every 7 (Seven) Days for 28 days, THEN 1 mg Every 7 (Seven) Days for 28 days., Disp: 6 mL, Rfl: 0    Allergies:   Allergies   Allergen Reactions    Medroxyprogesterone Unknown - Low Severity    Progesterone Shortness Of Breath     \"panic attack\"    Latex Itching    Wound Dressing Adhesive Hives and Itching    Sumatriptan Unknown - Low Severity     Panic attack    Adhesive Tape Rash    Fexofenadine-Pseudoephed Er Unknown - High Severity     \"allergic to some sort of antihistamine\"       Objective     Vital Signs  /70   Pulse 109   Temp 96.8 °F (36 °C) (Temporal)   SpO2 96%   Estimated body mass index is 38.31 kg/m² as calculated from the following:    Height as of 8/9/23: 165.1 cm (65\").    Weight as of 8/9/23: 104 kg (230 lb 3.2 oz).            Physical Exam  Constitutional:       Appearance: Normal appearance. She is obese.   Cardiovascular:      Rate and Rhythm: Normal rate and regular rhythm.      Pulses: Normal pulses.      Heart sounds: Normal heart sounds.   Pulmonary:      Effort: Pulmonary effort is normal.      Breath sounds: Normal breath sounds.   Neurological:      Mental Status: She is alert.          Result Review :                  Assessment and Plan     1. Morbid (severe) obesity due to excess calories  Discussed risk associated with obesity. she was given instructions on calorie counting as well as on decreasing carbohydrate intake. she was also encouraged to start exercise regimen.  Instructed patient to download fitness cata on her smart phone to aid in calorie counting and exercise tracking.    - Semaglutide-Weight Management (Wegovy) 0.25 MG/0.5ML solution auto-injector; Inject 0.25 mg under the skin into the appropriate area as directed Every 7 (Seven) Days for 28 days, THEN 0.5 mg Every 7 (Seven) Days for 28 days, THEN 1 mg Every 7 (Seven) Days for 28 days.  Dispense: 6 mL; Refill: 0    2. Cat allergies  OTC zyrtec, benadryl as needed    3. Hypothyroidism, unspecified " type  Continue with Synthroid 88mcg  TSH WNL  - Ambulatory Referral to Endocrinology       Follow Up  No follow-ups on file.    Ricardo Ricketts MD  [facility] [address]  [facility]  [address]  [address] [address] [phone]

## 2024-03-18 ENCOUNTER — TELEPHONE (OUTPATIENT)
Dept: INTERNAL MEDICINE | Facility: CLINIC | Age: 79
End: 2024-03-18
Payer: COMMERCIAL

## 2024-03-18 NOTE — TELEPHONE ENCOUNTER
Left message on voicemail asking patient to schedule an appointment to discuss her referrals.  Dr. Ricketts have you discussed these referrals with her before?

## 2024-03-18 NOTE — TELEPHONE ENCOUNTER
Caller: Margoth Perez    Relationship: Self    Best call back number: [phone]    What specialty or service is being requested:     NEUROLOGY, DERMATOLOGY, UROLOGY    Any additional details:     PLEASE CALL PATIENT WITH APPOINTMENT DETAILS        W Plasty Text: The lesion was extirpated to the level of the fat with a #15 scalpel blade.  Given the location of the defect, shape of the defect and the proximity to free margins a W-plasty was deemed most appropriate for repair.  Using a sterile surgical marker, the appropriate transposition arms of the W-plasty were drawn incorporating the defect and placing the expected incisions within the relaxed skin tension lines where possible.    The area thus outlined was incised deep to adipose tissue with a #15 scalpel blade.  The skin margins were undermined to an appropriate distance in all directions utilizing iris scissors.  The opposing transposition arms were then transposed into place in opposite direction and anchored with interrupted buried subcutaneous sutures.

## 2024-03-18 NOTE — TELEPHONE ENCOUNTER
She has never asked for referrals for this in the past and her past problem visits have not been for these issues, will address on 3/20/2024

## 2024-03-18 NOTE — TELEPHONE ENCOUNTER
Attempted to call pt, lft vm with cb number       Pt has appt on 3/20/24 and need to know if she could talk to Dr Ricketts about the referrals she is requesting at the visit     OK FOR HUB TO RELAY MESSAGE     Caller: Margoth Perez    Relationship: Self     Best call back number: [phone]     What specialty or service is being requested:      NEUROLOGY, DERMATOLOGY, UROLOGY     Any additional details:      PLEASE CALL PATIENT WITH APPOINTMENT DETAILS

## 2024-03-18 NOTE — TELEPHONE ENCOUNTER
Name: Margoth Perez      Relationship: Self      Best Callback Number: [phone]      HUB PROVIDED THE RELAY MESSAGE FROM THE OFFICE  OK FOR HUB TO RELAY MESSAGE      Caller: Margoth Perez     Relationship: Self     Best call back number: [phone]     What specialty or service is being requested:   NEUROLOGY, DERMATOLOGY, UROLOGY     Any additional details:      PLEASE CALL PATIENT WITH APPOINTMENT DETAILS      PATIENT: HAS FURTHER QUESTIONS AND WOULD LIKE A CALL BACK AT THE FOLLOWING PHONE NUMBER   [phone]    ADDITIONAL INFORMATION: MARGOTH WANTS REFERRALS TO EACH OF THESE SPECIALTIES WHO ARE PAR WITH [facility],,  SHE SAYS SHE HAS ISSUES WITHIN EACH SPECIALTY.  SHE SAYS SHE SPOKE TO YOU ABOUT THIS IN THE PAST YEAR.  SHE SAYS SHE IS TIRED OF NOT BEING UNDERSTOOD.    TRANSIENT AIN-NEUROLOGY-BODY PAIN OVER THE YEARS-SUDDEN PAIN., SKIN THINGYS-DERM,  AND INCONTINENCE FOR THE PAST 2 YEARS-UROLOGY

## 2024-03-20 ENCOUNTER — OFFICE VISIT (OUTPATIENT)
Dept: INTERNAL MEDICINE | Facility: CLINIC | Age: 79
End: 2024-03-20
Payer: COMMERCIAL

## 2024-03-20 VITALS
WEIGHT: 233.4 LBS | HEART RATE: 98 BPM | TEMPERATURE: 96.8 F | SYSTOLIC BLOOD PRESSURE: 118 MMHG | DIASTOLIC BLOOD PRESSURE: 90 MMHG | OXYGEN SATURATION: 96 % | HEIGHT: 65 IN | BODY MASS INDEX: 38.89 KG/M2

## 2024-03-20 DIAGNOSIS — L82.1 SEBORRHEIC KERATOSIS: ICD-10-CM

## 2024-03-20 DIAGNOSIS — L91.8 SKIN TAG: Primary | ICD-10-CM

## 2024-03-20 DIAGNOSIS — I48.91 ATRIAL FIBRILLATION, UNSPECIFIED TYPE: ICD-10-CM

## 2024-03-20 DIAGNOSIS — R20.2 PARESTHESIA OF BOTH LEGS: ICD-10-CM

## 2024-03-20 DIAGNOSIS — E66.01 MORBID (SEVERE) OBESITY DUE TO EXCESS CALORIES: ICD-10-CM

## 2024-03-20 DIAGNOSIS — I10 PRIMARY HYPERTENSION: ICD-10-CM

## 2024-03-20 DIAGNOSIS — R32 URINARY INCONTINENCE, UNSPECIFIED TYPE: ICD-10-CM

## 2024-03-20 DIAGNOSIS — L81.4 SOLAR LENTIGO: ICD-10-CM

## 2024-03-20 DIAGNOSIS — R35.1 NOCTURIA: ICD-10-CM

## 2024-03-20 DIAGNOSIS — E03.9 HYPOTHYROIDISM, UNSPECIFIED TYPE: ICD-10-CM

## 2024-03-20 RX ORDER — SEMAGLUTIDE 0.25 MG/.5ML
0.25 INJECTION, SOLUTION SUBCUTANEOUS WEEKLY
Qty: 3 ML | Refills: 0 | Status: SHIPPED | OUTPATIENT
Start: 2024-03-20

## 2024-03-20 NOTE — TELEPHONE ENCOUNTER
Left message on voicemail letting patient know referrals will be discussed at her appointment today.

## 2024-03-20 NOTE — TELEPHONE ENCOUNTER
Patient called put her on brief hold when I came back to the phone patient fell asleep. I did say hello several times.

## 2024-11-12 DIAGNOSIS — E03.9 HYPOTHYROIDISM, UNSPECIFIED TYPE: ICD-10-CM

## 2024-11-13 RX ORDER — LEVOTHYROXINE SODIUM 88 UG/1
88 TABLET ORAL DAILY
Qty: 90 TABLET | Refills: 3 | OUTPATIENT
Start: 2024-11-13

## 2024-11-13 NOTE — TELEPHONE ENCOUNTER
Rx Refill Note  Requested Prescriptions     Pending Prescriptions Disp Refills    levothyroxine (SYNTHROID, LEVOTHROID) 88 MCG tablet [Pharmacy Med Name: LEVOTHYROXINE 0.088MG (88MCG) TAB] 90 tablet 3     Sig: TAKE 1 TABLET BY MOUTH DAILY      Last office visit with prescribing clinician: Visit date not found   Last telemedicine visit with prescribing clinician: Visit date not found   Next office visit with prescribing clinician: Visit date not found                         Would you like a call back once the refill request has been completed: [] Yes [] No    If the office needs to give you a call back, can they leave a voicemail: [] Yes [] No    Yareli Diaz MA  11/13/24, 09:00 EST